# Patient Record
Sex: MALE | Race: OTHER | HISPANIC OR LATINO | URBAN - METROPOLITAN AREA
[De-identification: names, ages, dates, MRNs, and addresses within clinical notes are randomized per-mention and may not be internally consistent; named-entity substitution may affect disease eponyms.]

---

## 2018-11-19 ENCOUNTER — EMERGENCY (EMERGENCY)
Facility: HOSPITAL | Age: 43
LOS: 1 days | Discharge: HOME | End: 2018-11-19
Attending: EMERGENCY MEDICINE | Admitting: EMERGENCY MEDICINE
Payer: MEDICAID

## 2018-11-19 VITALS
DIASTOLIC BLOOD PRESSURE: 90 MMHG | HEART RATE: 73 BPM | OXYGEN SATURATION: 100 % | RESPIRATION RATE: 18 BRPM | TEMPERATURE: 98 F | SYSTOLIC BLOOD PRESSURE: 130 MMHG

## 2018-11-19 VITALS
SYSTOLIC BLOOD PRESSURE: 135 MMHG | DIASTOLIC BLOOD PRESSURE: 92 MMHG | HEART RATE: 75 BPM | TEMPERATURE: 98 F | RESPIRATION RATE: 16 BRPM | OXYGEN SATURATION: 100 %

## 2018-11-19 PROCEDURE — 99282 EMERGENCY DEPT VISIT SF MDM: CPT | Mod: 25

## 2018-11-19 NOTE — ED ADULT NURSE NOTE - OBJECTIVE STATEMENT
Pt rcvd to spot [facility] as intox BIBEMS from street.  Pt is awake, refusing to get changed initially into hospital gown.  PES brought to bedside and pt changed out of clothes, 1 duffle bag, pt's clothes/coat and keys secured into BH by PES - pt retained cell phone as ok'd by ED MD in attempts for pt to contact his family for .  Pt denies any medical complaints at this time,  states "drank a lot" tonight passed out and then someone called and EMS brought him to hospital.  No obvious signs of trauma noted.  Pt vitally stable, aaox3, cooperative to staff interaction, intermittently verbally labile/expressive but redirectable and no signs of aggression.  At this time awaiting ED ATTD eval and will allow to metabolize etoh until clinically sober and able to be dc'd.  WIll CTM. Pt rcvd to spot [facility] as intox BIBEMS from street.  Pt is awake, refusing to get changed initially into hospital gown.  PES brought to bedside and pt changed out of clothes, 1 duffle bag, pt's clothes/coat and keys secured by PES - pt retained cell phone & watch as ok'd by ED MD in attempts for pt to contact his family for . All belongings in closet by 21 or valuables to security.  Pt denies any medical complaints at this time,  states "drank a lot" tonight passed out and then someone called and EMS brought him to hospital.  No obvious signs of trauma noted.  Pt vitally stable, aaox3, cooperative to staff interaction, intermittently verbally labile/expressive but redirectable and no signs of aggression.  At this time awaiting ED ATTD eval and will allow to metabolize etoh until clinically sober and able to be dc'd.  WIll CTM.

## 2018-11-19 NOTE — ED ADULT TRIAGE NOTE - CHIEF COMPLAINT QUOTE
Pt BIBEMS [facility], reports Bystander called EMS, found Pt. sleeping on someone Parking lot who is about to park. Noted Pt is Alcohol Intoxicated Denies any medical; complaints

## 2018-11-19 NOTE — ED PROVIDER NOTE - OBJECTIVE STATEMENT
43 Y M with no stated past medical hx who presents intoxicated, he states he was out drinking with friends and passed out and someone brought him here. He denies any complaints at this time. He says he would like to go home. Attempted to ambulate pt but he is too intoxicated at this point. Attempted to contact his wife and she currently has her phone off. Pt is concerned because he needs to work at 8 am, I explained that he cannot leave in his current state and he agrees to wait to become more sober or wait until someone can come pick him up. He denies chronic alcohol abuse states that he just drinks occasionally. He denies LOC, injury, fever.

## 2018-11-19 NOTE — ED ADULT NURSE REASSESSMENT NOTE - NS ED NURSE REASSESS COMMENT FT1
Pt's wife called pt's cell phone, ED MD Eldridge aware, came to bedside, spoke w/ wife on cell phone, will come to [facility] to  patient within the next hour.  All belongings to be returned to patient, and will await .  Pt in no acute distress at this time, intermittently sleeping, cooperative to staff, will ctm. Pt's wife called pt's cell phone, ED MD Eldridge aware, came to bedside, spoke w/ wife on cell phone, will come to [facility] ER to  patient within the next 1-2 hour.  All belongings to be returned to patient, and will await .  Pt in no acute distress at this time, intermittently sleeping, cooperative to staff, will ctm.

## 2018-11-19 NOTE — ED ADULT NURSE NOTE - NSIMPLEMENTINTERV_GEN_ALL_ED
Implemented All Universal Safety Interventions:  [place] to call system. Call bell, personal items and telephone within reach. Instruct patient to call for assistance. Room bathroom lighting operational. Non-slip footwear when patient is off stretcher. Physically safe environment: no spills, clutter or unnecessary equipment. Stretcher in lowest position, wheels locked, appropriate side rails in place. Implemented All Fall Risk Interventions:  [place] to call system. Call bell, personal items and telephone within reach. Instruct patient to call for assistance. Room bathroom lighting operational. Non-slip footwear when patient is off stretcher. Physically safe environment: no spills, clutter or unnecessary equipment. Stretcher in lowest position, wheels locked, appropriate side rails in place. Provide visual cue, wrist band, yellow gown, etc. Monitor gait and stability. Monitor for mental status changes and reorient to person, place, and time. Review medications for side effects contributing to fall risk. Reinforce activity limits and safety measures with patient and family.

## 2018-11-19 NOTE — ED PROVIDER NOTE - MEDICAL DECISION MAKING DETAILS
43 Y M who is clinically intoxicated admits to drinking tonight has no one that can pick him up from the ED at the moment. Will monitor patient until clinically sober or until he is able to get a ride home from the Ed.

## 2018-11-19 NOTE — ED PROVIDER NOTE - PROGRESS NOTE DETAILS
Chente GR: Pt able to get a hold of his wife.  Spoke to wife on the phone, states she will come to the ER to pick him up. Ghislaine GR: Patient's wife came to the ER to  patient.  Discussed any questions concerns with family and return precautions. Ghislaine GR: Patient's wife came to the ER to  patient.  Discussed any questions concerns with family and return precautions.  Patient is AAOx3; ambulating w/o difficulty.

## 2018-11-19 NOTE — ED PROVIDER NOTE - ATTENDING CONTRIBUTION TO CARE
44 y/o M with no known PMH BIB EMS for alcohol intoxication.  Pt found sleeping in someone's driveway.  Bystanders called EMS.  Pt denies any fall or trauma.  States he was out drinking with friends tonight and trying to get home.  Denies concomitant drug use.  No other complaints.  Pt lying comfortably in stretcher, awake and alert, nontoxic.  VSS.  NCAT, EOMI, PERRL.  Neck supple no midline tenderness.  Lungs cta bl.  Cards nl S1/S2, RRR, no MRG.  Abd soft ntnd.  Pelvis stable, all extremities intact, FROM, no gross deformities.  No pedal edema or calf tenderness.  Pt still intox, will reassess for sobriety.  Attempted to call family to ensure safe dc without response.  Will reassess.

## 2023-08-17 NOTE — ED ADULT NURSE NOTE - CAS DISCH BELONGINGS RETURNED
Rest and drink plenty of fluids. Apply ice 20 minutes on and then 40 minutes off several times a day. Alternate ice with warm, moist compresses. Start the Medrol Dosepak and make sure to take this with food. Take Tylenol as needed for pain. Use the muscle relaxer for muscle stiffness or soreness. After the acute pain improves, start with light stretching or yoga to help prevent further injury. Follow up with your PCP in 3-5 days. Thank you for choosing [facility] for your care. Yes